# Patient Record
Sex: MALE | Race: WHITE | NOT HISPANIC OR LATINO | Employment: UNEMPLOYED | URBAN - METROPOLITAN AREA
[De-identification: names, ages, dates, MRNs, and addresses within clinical notes are randomized per-mention and may not be internally consistent; named-entity substitution may affect disease eponyms.]

---
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
## 2018-01-12 NOTE — RESULT NOTES
Verified Results  ECHO COMPLETE WITH CONTRAST IF INDICATED [id number] 12:56PM Sneha Farrar     Test Name Result Flag Reference   ECHO COMPLETE WITH CONTRAST IF INDICATED (Report)     [address]   [address]   [phone]     Transthoracic Echocardiogram   2D, M-mode, Doppler, and Color Doppler     Study date: 10-Oct-2017     Patient: Manuel Small   MR number: [id number]   Account number: [de-identified]   : 2000   Age: 21 years   Gender: Male   Status: Outpatient   [facility]: [address] [place] [address] [facility]   Height: 75 in   Weight: 201 lb   BP: 120/ 82 mmHg     Indications: Chest pain, bradycardia     Diagnoses: R00 1 - Bradycardia, unspecified, R07 9 - Chest pain, unspecified     Sonographer: RUFINO Houser   Referring Physician: Abi Fuentes DO   Group: [id number]   Interpreting Physician: Seamus Timmons DO     SUMMARY     LEFT VENTRICLE:   Systolic function was normal by visual assessment  Ejection fraction was estimated to be 60 %  MITRAL VALVE:   There was trace regurgitation  PULMONIC VALVE:   There was mild regurgitation  HISTORY: PRIOR HISTORY: Asthma     PROCEDURE: [facility]  This was a routine study  The transthoracic approach was used  The study included complete 2D imaging, M-mode, complete spectral Doppler, and color Doppler  Image   quality was adequate  LEFT VENTRICLE: Size was normal  Systolic function was normal by visual assessment  Ejection fraction was estimated to be 60 %  DOPPLER: Left ventricular diastolic function parameters were normal      RIGHT VENTRICLE: The size was normal  Systolic function was normal      LEFT ATRIUM: Size was normal      RIGHT ATRIUM: Size was normal      MITRAL VALVE: Valve structure was normal  DOPPLER: There was trace regurgitation  AORTIC VALVE: The valve was trileaflet   Leaflets exhibited good mobility  TRICUSPID VALVE: The valve structure was normal  DOPPLER: There was no significant regurgitation  PULMONIC VALVE: Not well visualized  DOPPLER: There was mild regurgitation  PERICARDIUM: There was no pericardial effusion  The pericardium was normal in appearance  AORTA: The root exhibited normal size       MEASUREMENT TABLES     DOPPLER MEASUREMENTS   Tricuspid valve  (Reference normals)   RV-RA peak gradient  20 mmHg  (--)     SYSTEM MEASUREMENT TABLES     2D   %FS: 35 17 %   AV Diam: 2 65 cm   EDV(Teich): 131 48 ml   EF(Cube): 72 75 %   EF(Teich): 64 09 %   ESV(Cube): 39 07 ml   ESV(Teich): 47 21 ml   IVSd: 1 06 cm   LA Area: 18 86 cm2   LA Diam: 3 48 cm   LVEDV MOD A4C: 231 29 ml   LVEF MOD A4C: 50 2 %   LVESV MOD A4C: 115 19 ml   LVIDd: 5 23 cm   LVIDs: 3 39 cm   LVLd A4C: 11 18 cm   LVLs A4C: 9 56 cm   LVPWd: 1 12 cm   RA Area: 18 05 cm2   RV Diam : 4 02 cm   SV MOD A4C: 116 11 ml   SV(Cube): 104 32 ml   SV(Teich): 84 27 ml     CW   TR Vmax: 2 25 m/s   TR maxP 26 mmHg     MM   TAPSE: 2 3 cm     PW   E': 0 13 m/s   E/E': 5 83   MV A Varun: 0 3 m/s   MV Dec Allen: 2 49 m/s2   MV DecT: 303 52 ms   MV E Varun: 0 76 m/s   MV E/A Ratio: 2 52     [facility] Accredited [facility]     Prepared and electronically signed by     Kody Good DO   Signed 10-Oct-2017 14:51:15

## 2018-01-12 NOTE — PROGRESS NOTES
Assessment    1  Encounter for preventive health examination (V70 0) (Z00 00)    Plan  Health Maintenance    · Always use a seat belt and shoulder strap when riding or driving a motor vehicle ;  Status:Complete;   Done: 74FWN7010 02:12PM   · Begin or continue regular aerobic exercise  Gradually work up to at least 3 sessions of 30  minutes of exercise a week ; Status:Complete;   Done: 95CXO7096 02:12PM   · Brush your teeth 3 times a day and floss at least once a day ; Status:Complete;   Done:  75IVQ3684 02:12PM   · Decreasing the stress in your life may help your condition improve ; Status:Complete;    Done: 09ILX0944 02:12PM   · Diets that are low in carbohydrates and high in protein are very popular for weight loss ;  Status:Complete;   Done: 43ZSU2302 02:12PM   · Eat a low fat and low cholesterol diet ; Status:Complete;   Done: 73GAI4147 02:12PM   · Put a smoke detector in your living area to warn you in case of fire ; Status:Complete;    Done: 81EUZ1320 02:12PM   · Regular aerobic exercise can help reduce stress ; Status:Complete;   Done: 45WYJ0331  02:12PM   · Stretch and warm up your muscles during the first 10 minutes , then cool down your  muscles for the last 10 minutes of exercise ; Status:Complete;   Done: 41KIW9347  02:12PM   · There are many ways to reduce your risk of catching or spreading a sexually transmitted  Infection ; Status:Complete;   Done: 17EDE3220 02:12PM   · Use a sun block product with an SPF of 15 or more ; Status:Complete;   Done:  87FQY2133 02:12PM   · Using a latex condom can help prevent pregnancy  It can also help to prevent the spread  of sexually transmitted infections ; Status:Complete;   Done: 82WPH7373 02:12PM   · We recommend routine visits to a dentist ; Status:Complete;   Done: 47TTS8367  02:12PM   · We recommend that you bring your body mass index down to 26 ; Status:Complete;    Done: 80KRK0838 02:12PM   · Call [phone] if:  You have any warning signs of skin cancer ; Status:Complete;    Done: 70PDZ2341 02:12PM    Discussion/Summary  Impression: health maintenance visit  Currently, he eats a healthy diet and has an adequate exercise regimen  Testicular cancer screening: the risks and benefits of testicular cancer screening were discussed, self testicular exam technique was taught and monthly self testicular exam was advised  Colorectal cancer screening: colorectal cancer screening is not indicated  The immunizations are up to date  He was advised to be evaluated by a dentist  Advice and education were given regarding nutrition, aerobic exercise, weight bearing exercise, weight loss, calcium supplements, vitamin D supplements, reproductive health, sunscreen use, self skin examination, helmet use and seat belt use  Patient discussion: discussed with the patient, father  Carlos and his dad are pleased with his weight loss He does mixed martial arts and boxing He is eating a vegan diet also Pateint is cleared to drive  Chief Complaint  HERE TODAY FOR DRIVERS PHYSICAL      History of Present Illness  HM, Adult Male: The patient is being seen for a health maintenance evaluation  The last health maintenance visit was 18 month(s) ago  Social History: Household members include mother  He is unmarried  Work status: working part-time  The patient has never smoked cigarettes  He reports never drinking alcohol  He has never used illicit drugs  General Health: The patient's health since the last visit is described as good  He has regular dental visits  He denies vision problems  He denies hearing loss  Immunizations status: up to date  Lifestyle:  He consumes a diverse and healthy diet  He has weight concerns  He exercises regularly  He does not use tobacco  He denies alcohol use  He denies drug use  Reproductive health:  the patient is sexually active  birth control is not being practiced  He denies erectile dysfunction     Screening:   HPI: Patient is here with his dad for 's PE Patient is looking for work  He is also trying to get into trade school He has no new issues He went to a vegan diet and started boxing and basketball and lost over 100 pounds Patient and his dad are very pleased with his results      Review of Systems    Constitutional: No fever or chills, feels well, no tiredness, no recent weight gain or weight loss  Eyes: no eye pain and no eyesight problems  ENT: no complaints of earache, no hearing loss, no nosebleeds, no nasal discharge, no sore throat, no hoarseness  Cardiovascular: no chest pain, no intermittent leg claudication, no palpitations and no extremity edema  Respiratory: No complaints of shortness of breath, no wheezing, no cough, no SOB on exertion, no orthopnea or PND  Gastrointestinal: No complaints of abdominal pain, no constipation, no nausea or vomiting, no diarrhea or bloody stools  Genitourinary: no dysuria and no incontinence  Musculoskeletal: no arthralgias and no myalgias  Integumentary: no rashes  Neurological: No compliants of headache, no confusion, no convulsions, no numbness or tingling, no dizziness or fainting, no limb weakness, no difficulty walking  Psychiatric: no anxiety, no sleep disturbances and no depression  Active Problems    1  Allergic rhinitis (477 9) (J30 9)   2  Asthma (493 90) (J45 909)   3  Psychosocial Support Lack Of Social Support From Friends (V62 4)   4  Somatic dysfunction of thoracic region (739 2) (M99 02)   5   Unrefreshed by sleep (780 59) (G47 8)    Past Medical History    · History of acute pharyngitis (V12 69) (Z87 09)   · History of Strain of thoracic region (847 1) (S29 019A)    Family History  Mother    · No pertinent family history  Maternal Grandmother    · Family history of Hypertension (V17 49)  Family History    · Family history of Asthma (V17 5)    Social History    · Psychosocial Support Lack Of Social Support From Friends (V62 4)   · Sexual Activity Denied   · Unknown If Ever Smoked    Allergies    1  No Known Drug Allergies    Vitals   Recorded: 04Eqm9306 01:50PM   Temperature 29 8 F   Systolic 228   Diastolic 80   Height 6 ft 3 in   Weight 195 lb    BMI Calculated 24 37   BSA Calculated 2 17   BMI Percentile 67 %   2-20 Stature Percentile 97 %   2-20 Weight Percentile 90 %     Physical Exam    Constitutional   General appearance: No acute distress, well appearing and well nourished  Head and Face   Head and face: Normal     Palpation of the face and sinuses: No sinus tenderness  Eyes   Conjunctiva and lids: No erythema, swelling or discharge  Pupils and irises: Equal, round, reactive to light  Ears, Nose, Mouth, and Throat   External inspection of ears and nose: Normal     Otoscopic examination: Tympanic membranes translucent with normal light reflex  Canals patent without erythema  Hearing: Normal     Nasal mucosa, septum, and turbinates: Normal without edema or erythema  Lips, teeth, and gums: Normal, good dentition  Oropharynx: Normal with no erythema, edema, exudate or lesions  Neck   Neck: Supple, symmetric, trachea midline, no masses  Thyroid: Normal, no thyromegaly  Pulmonary   Respiratory effort: No increased work of breathing or signs of respiratory distress  Auscultation of lungs: Clear to auscultation  Cardiovascular   Auscultation of heart: Normal rate and rhythm, normal S1 and S2, no murmurs  Carotid pulses: 2+ bilaterally  Peripheral vascular exam: Normal     Examination of extremities for edema and/or varicosities: Normal     Abdomen   Abdomen: Non-tender, no masses  Liver and spleen: No hepatomegaly or splenomegaly  Lymphatic   Palpation of lymph nodes in neck: No lymphadenopathy  Musculoskeletal   Gait and station: Normal     Inspection/palpation of digits and nails: Normal without clubbing or cyanosis      Inspection/palpation of joints, bones, and muscles: Normal     Range of motion: Normal  Stability: Normal     Muscle strength/tone: Normal     Skin   Skin and subcutaneous tissue: Normal without rashes or lesions  Neurologic   Cranial nerves: Cranial nerves 2-12 intact  Cortical function: Normal mental status  Reflexes: 2+ and symmetric  Sensation: No sensory loss  Coordination: Normal finger to nose and heel to shin         Signatures   Electronically signed by : Ventura Sy DO; Aug  4 2017  2:13PM EST                       (Author)

## 2018-01-13 VITALS
BODY MASS INDEX: 24.87 KG/M2 | OXYGEN SATURATION: 99 % | SYSTOLIC BLOOD PRESSURE: 102 MMHG | WEIGHT: 200 LBS | HEART RATE: 50 BPM | DIASTOLIC BLOOD PRESSURE: 48 MMHG | HEIGHT: 75 IN

## 2018-01-13 VITALS
TEMPERATURE: 98.3 F | DIASTOLIC BLOOD PRESSURE: 80 MMHG | SYSTOLIC BLOOD PRESSURE: 122 MMHG | HEIGHT: 75 IN | BODY MASS INDEX: 24.25 KG/M2 | WEIGHT: 195 LBS

## 2018-01-13 NOTE — RESULT NOTES
Verified Results  HOLTER MONITOR - 24 HOUR [id number] 12:57PM Romel Cox Order Number: [id number]    - Patient Instructions: To schedule this appointment, please contact Central Scheduling at [phone]  Test Name Result Flag Reference   HOLTER MONITOR - 24 HOUR (Report)     Indication: Bradycardia     Patient was monitored for a total of 24 hours from 1:35 p m  on 10/10/2017  A total of 42532 beats were monitored  The image quality was good  The predominant rhythm was normal sinus rhythm  Average heart rate was 60 bpm  Minimum heart rate was 36 bpm which was sinus bradycardia at 5:47 a m    Maximum heart rate was 133 bpm which was sinus tachycardia at 3:14 p m  Ida Nicole There was 45 minutes of sinus tachycardia  There was 16 hours of sinus bradycardia  There was no ventricular ectopy during the monitored period  Supraventricular ectopy consisted of 49 PACs only  There was no atrial fibrillation during the monitored period  There were no other arrhythmias other than as described above  A symptom diary was returned for correlation  Patient complained of pain on 1 occasion which correlated with sinus tachycardia     Impression:     Overall unremarkable 24 hrs of holter monitoring  Predominant rhythm was normal sinus rhythm  Normal diurnal variation of heart rate   Minimal supraventricular ectopy during the monitored period  No ventricular ectopy during the monitored period  One episode of pain was noted which correlated with sinus tachycardia  There were no ST segment or T-wave changes

## 2018-01-15 VITALS
SYSTOLIC BLOOD PRESSURE: 120 MMHG | WEIGHT: 201.13 LBS | HEIGHT: 75 IN | BODY MASS INDEX: 25.01 KG/M2 | DIASTOLIC BLOOD PRESSURE: 82 MMHG

## 2018-01-16 NOTE — PROGRESS NOTES
Assessment    1  Encounter for preventive health examination (V70 0) (Z00 00)   2  Obesity (278 00) (E66 9)    Plan  Obesity    · (1) CBC/PLT/DIFF; Status:Active; Requested for:36Rnp7126;    · (1) COMPREHENSIVE METABOLIC PANEL; Status:Active; Requested for:60Jkw8256;    · (1) HEMOGLOBIN A1C; Status:Active; Requested for:10Bdq9421;    · (1) LIPID PANEL, FASTING; Status:Active; Requested for:30Neg3233;    · (1) TSH; Status:Active; Requested for:21Cpj4020;    · Begin a limited exercise program ; Status:Complete;   Done: 36VPK4111 10:23AM   · Diets that are low in carbohydrates and high in protein are very popular for weight loss ;  Status:Complete;   Done: 63EDK5297 10:23AM   · Eat a low fat and low cholesterol diet ; Status:Complete;   Done: 05SJP8166 10:23AM   · Some eating tips that can help you lose weight ; Status:Complete;   Done: 69GOE3636  10:23AM   · We recommend that you bring your body mass index down to 26 ; Status:Complete;    Done: 26TZW0158 10:23AM    Discussion/Summary    Impression:   No elimination, skin and sleep concerns  Growth concerns include excessive weight gain  constant hunger  No vaccines needed  Chief Complaint  DRIVERS PERMIT PHYSICAL      History of Present Illness  HM, 12-18 years Male (Brief): Toan Bennett presents today for routine health maintenance with his 23year-old male presented to the office for driving physical    General Health: The child's health since the last visit is described as fair  Dental hygiene: Good  Immunization status: Up to date  Caregiver concerns:  Having a lot of problems making good choices when he eats  Caregivers deny concerns regarding behavior and school  Nutrition/Elimination:   Diet:  his current diet needs improvement: is too high in calories, is insufficient in fruit, is insufficient in vegetables, needs elimination of junk food, is high in fat, is high in salt and is high in sugar  No elimination issues are expressed  Sleep:   No sleep issues are reported  Behavior:   Health Risks:   Childcare/School:   Sports Participation Questions:      Review of Systems    Constitutional: recent 15 lb weight gain, but No complaints of tiredness, feels well, no fever, no chills, no recent weight gain or loss  Eyes: No complaints of eye pain, no discharge from eyes, no eyesight problems, eyes do not itch, no red or dry eyes  ENT: no complaints of nasal discharge, no earache, no loss of hearing, no hoarseness or sore throat, no nosebleeds  Cardiovascular: No complaints of chest pain, no palpitations, normal heart rate, no leg claudication or lower leg edema  Respiratory: No complaints of shortness of breath, no wheezing or cough, no dyspnea on exertion  Gastrointestinal: No complaints of abdominal pain, no nausea or vomiting, no constipation, no diarrhea or bloody stools  Genitourinary: No complaints of testicular pain, no dysuria or nocturia, no incontinence, no hesitancy, no gential lesion  Musculoskeletal: No complaints of joint stiffness or swelling, no myalgias, no limb pain or swelling  Integumentary: No complaints of skin rash, no skin lesions or wounds, no itching, no dry skin  Neurological: No complaints of headache, no numbness or tingling, no dizziness or fainting, no confusion, no convulsions, no limb weakness or difficulty walking  Psychiatric: No complaints of feeling depressed, no suicidal thoughts, no emotional problems, no anxiety, no sleep disturbances or changes in personality  Endocrine: No complaints of muscle weakness, no feelings of weakness, no erectile dysfunction, no deepening of voice, no hot flashes or proptosis  Hematologic/Lymphatic: No complaints of swollen glands, no neck swollen glands, does not bleed or bruise easily  ROS reported by the patient  Active Problems    1  Allergic rhinitis (477 9) (J30 9)   2  Asthma (493 90) (J45 909)   3  Obesity (278 00) (E66 9)   4   Psychosocial Support Lack Of Social Support From Friends (V62 4)   5  Somatic dysfunction of thoracic region (739 2) (M99 02)   6  Unrefreshed by sleep (780 59) (G47 8)    Past Medical History    · History of acute pharyngitis (V12 69) (Z87 09)   · History of Strain of thoracic region (847 1) (S29 019A)    Family History    · Family history of Hypertension (V17 49)    · Family history of Asthma (V17 5)    Social History    · Psychosocial Support Lack Of Social Support From Friends (V62 4)   · Sexual Activity Denied   · Unknown If Ever Smoked    Current Meds   1  Fluticasone Propionate 50 MCG/ACT Nasal Suspension; USE 2 SPRAYS IN EACH   NOSTRIL ONCE DAILY; Therapy: 40OQD7599 to (Last WO:93LCO1532)  Requested for: 47PEM8140 Ordered   2  Loratadine 10 MG Oral Tablet; TAKE 1 TABLET EVERY MORNING; Therapy: 22UFA8780 to (Evaluate:13Axa5794)  Requested for: 31UWC2327; Last   Rx:10Oqf0777 Ordered   3  Montelukast Sodium 10 MG Oral Tablet; Take 1 tablet by mouth at bedtime  Requested   for: 09NWH0118; Last Rx:69Len0316 Ordered   4  ProAir  (90 Base) MCG/ACT Inhalation Aerosol Solution; 2 PUFFS QID AS   NEEDED FOR SOB  Requested for: 11ZIJ3283; Last Rx:89Zks9174 Ordered    Allergies    1  No Known Drug Allergies    Vitals   Recorded: 01BZX7167 32:41XG   Systolic 201   Diastolic 82   Height 6 ft 3 in   2-20 Stature Percentile 98 %   Weight 313 lb    2-20 Weight Percentile 99 %   BMI Calculated 39 12   BMI Percentile 99 %   BSA Calculated 2 65     Physical Exam    Constitutional - General appearance: Abnormal  overweight  Eyes - Conjunctiva and lids: No injection, edema or discharge  Pupils and irises: Equal, round, reactive to light bilaterally  Ophthalmoscopic examination: Optic discs sharp  Ears, Nose, Mouth, and Throat - External inspection of ears and nose: Normal without deformities or discharge  Otoscopic examination: Tympanic membranes gray, translucent with good bony landmarks and light reflex  Canals patent without erythema  Hearing: Normal  Nasal mucosa, septum, and turbinates: Normal, no edema or discharge  Lips, teeth, and gums: Normal, good dentition  Oropharynx: Moist mucosa, normal tongue and tonsils without lesions  Neck - Neck: Supple, symmetric, no masses  Thyroid: No thyromegaly  Pulmonary - Respiratory effort: Normal respiratory rate and rhythm, no increased work of breathing  Percussion of chest: Normal  Palpation of chest: Normal  Auscultation of lungs: Clear bilaterally  Cardiovascular - Palpation of heart: Normal PMI, no thrill  Auscultation of heart: Regular rate and rhythm, normal S1 and S2, no murmur  Carotid pulses: Normal, 2+ bilaterally  Abdominal aorta: Normal  Femoral pulses: Normal, 2+ bilaterally  Pedal pulses: Normal, 2+ bilaterally  Examination of extremities for edema and/or varicosities: Normal    Chest - Breasts: Normal  Palpation of breasts and axillae: Normal    Abdomen - Abdomen: Normal bowel sounds, soft, non-tender, no masses  Liver and spleen: No hepatomegaly or splenomegaly  Examination for hernias: No hernias palpated  Lymphatic - Palpation of lymph nodes in neck: No anterior or posterior cervical lymphadenopathy  Palpation of lymph nodes in axillae: No lymphadenopathy  Palpation of lymph nodes in groin: No lymphadenopathy  Palpation of lymph nodes in other areas: No lymphadenopathy  Musculoskeletal - Gait and station: Normal gait  Digits and nails: Normal without clubbing or cyanosis  Inspection/palpation of joints, bones, and muscles: Normal  Evaluation for scoliosis: No scoliosis on exam  Range of motion: Normal  Stability: No joint instability  Muscle strength/tone: Normal    Skin - Skin and subcutaneous tissue: No rash or lesions   Palpation of skin and subcutaneous tissue: Normal    Neurologic - Cranial nerves: Normal  Reflexes: Normal  Sensation: Normal    Psychiatric - judgment and insight: Normal  Orientation to person, place, and time: Normal  Recent and remote memory: Normal  Mood and affect: Normal       Procedure    Procedure: Visual Acuity Test    Indication: routine screening  Inforrmation supplied by a Snellen chart     Results: 20/20 in both eyes without corrective device, 20/20 in the right eye without corrective device, 20/20 in the left eye without corrective device      Signatures   Electronically signed by : Daria Pickens DO; Feb 29 2016 10:24AM EST                       (Author)

## 2022-06-15 ENCOUNTER — APPOINTMENT (EMERGENCY)
Dept: RADIOLOGY | Facility: HOSPITAL | Age: 22
End: 2022-06-15
Payer: COMMERCIAL

## 2022-06-15 ENCOUNTER — HOSPITAL ENCOUNTER (EMERGENCY)
Facility: HOSPITAL | Age: 22
Discharge: HOME | End: 2022-06-15
Attending: EMERGENCY MEDICINE | Admitting: EMERGENCY MEDICINE
Payer: COMMERCIAL

## 2022-06-15 VITALS
OXYGEN SATURATION: 100 % | RESPIRATION RATE: 19 BRPM | HEIGHT: 76 IN | HEART RATE: 86 BPM | WEIGHT: 213.2 LBS | SYSTOLIC BLOOD PRESSURE: 126 MMHG | TEMPERATURE: 97.6 F | DIASTOLIC BLOOD PRESSURE: 68 MMHG | BODY MASS INDEX: 25.96 KG/M2

## 2022-06-15 DIAGNOSIS — S69.91XA INJURY OF RIGHT HAND, INITIAL ENCOUNTER: ICD-10-CM

## 2022-06-15 DIAGNOSIS — S01.511A LIP LACERATION, INITIAL ENCOUNTER: ICD-10-CM

## 2022-06-15 DIAGNOSIS — S62.339A CLOSED BOXER'S FRACTURE, INITIAL ENCOUNTER: Primary | ICD-10-CM

## 2022-06-15 DIAGNOSIS — R04.0 EPISTAXIS DUE TO TRAUMA: ICD-10-CM

## 2022-06-15 PROCEDURE — 12011 RPR F/E/E/N/L/M 2.5 CM/<: CPT | Performed by: EMERGENCY MEDICINE

## 2022-06-15 PROCEDURE — NC001 PR NO CHARGE: Performed by: ORTHOPAEDIC SURGERY

## 2022-06-15 PROCEDURE — 73130 X-RAY EXAM OF HAND: CPT

## 2022-06-15 PROCEDURE — 71046 X-RAY EXAM CHEST 2 VIEWS: CPT

## 2022-06-15 PROCEDURE — 99284 EMERGENCY DEPT VISIT MOD MDM: CPT | Performed by: EMERGENCY MEDICINE

## 2022-06-15 PROCEDURE — 99284 EMERGENCY DEPT VISIT MOD MDM: CPT

## 2022-06-15 PROCEDURE — 73120 X-RAY EXAM OF HAND: CPT

## 2022-06-15 RX ORDER — LIDOCAINE HYDROCHLORIDE 10 MG/ML
20 INJECTION, SOLUTION EPIDURAL; INFILTRATION; INTRACAUDAL; PERINEURAL ONCE
Status: COMPLETED | OUTPATIENT
Start: 2022-06-15 | End: 2022-06-15

## 2022-06-15 RX ORDER — IBUPROFEN 600 MG/1
600 TABLET ORAL ONCE
Status: COMPLETED | OUTPATIENT
Start: 2022-06-15 | End: 2022-06-15

## 2022-06-15 RX ORDER — SENNOSIDES 8.6 MG
650 CAPSULE ORAL EVERY 8 HOURS PRN
Qty: 30 TABLET | Refills: 0 | Status: SHIPPED | OUTPATIENT
Start: 2022-06-15

## 2022-06-15 RX ORDER — NAPROXEN 500 MG/1
500 TABLET ORAL 2 TIMES DAILY WITH MEALS
Qty: 30 TABLET | Refills: 0 | Status: SHIPPED | OUTPATIENT
Start: 2022-06-15

## 2022-06-15 RX ORDER — ACETAMINOPHEN 325 MG/1
975 TABLET ORAL ONCE
Status: DISCONTINUED | OUTPATIENT
Start: 2022-06-15 | End: 2022-06-15 | Stop reason: HOSPADM

## 2022-06-15 RX ORDER — OXYCODONE HYDROCHLORIDE 5 MG/1
5 TABLET ORAL ONCE
Status: COMPLETED | OUTPATIENT
Start: 2022-06-15 | End: 2022-06-15

## 2022-06-15 RX ORDER — LIDOCAINE HYDROCHLORIDE AND EPINEPHRINE 10; 10 MG/ML; UG/ML
10 INJECTION, SOLUTION INFILTRATION; PERINEURAL ONCE
Status: COMPLETED | OUTPATIENT
Start: 2022-06-15 | End: 2022-06-15

## 2022-06-15 RX ADMIN — IBUPROFEN 600 MG: 600 TABLET ORAL at 15:24

## 2022-06-15 RX ADMIN — LIDOCAINE HYDROCHLORIDE 20 ML: 10 INJECTION, SOLUTION EPIDURAL; INFILTRATION; INTRACAUDAL; PERINEURAL at 13:54

## 2022-06-15 RX ADMIN — LIDOCAINE HYDROCHLORIDE,EPINEPHRINE BITARTRATE 10 ML: 10; .01 INJECTION, SOLUTION INFILTRATION; PERINEURAL at 13:09

## 2022-06-15 RX ADMIN — OXYCODONE HYDROCHLORIDE 5 MG: 5 TABLET ORAL at 15:24

## 2022-06-15 NOTE — DISCHARGE INSTRUCTIONS
Discharge Instructions - Orthopedics  Maicol Siu 23 y o  male MRN: [id number]  Unit/Bed#: X ray    Weight Bearing Status:                                           Nonweightbearing to right upper extremity in splint    DVT prophylaxis  None     Pain:  Continue analgesics as directed    Splint Instructions:   Please keep clean, dry and intact until follow up     Appt Instructions: If you do not have your appointment, please call the clinic at [phone] t  Otherwise followup as scheduled     Contact the office sooner if you experience any increased numbness/tingling in the extremities        Miscellaneous:  F/u one week with Dr Kelly Monsivais
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
## 2022-06-20 NOTE — TELEPHONE ENCOUNTER
Hello,    Please advise if the following patient can be forced onto the schedule:    Patient: Lexx Brar    :[birth year]    MRN: [id number]    Call back #: [phone] Sandhya Millers)    Insurance:MA pending     Reason for appointment: Status post reduction of the angulated fracture of the 5th metacarpal   There is mild persistent angulation remaining    Requested doctor/location: [place]       Thank you      Email sent to [facility]

## 2022-06-21 ENCOUNTER — APPOINTMENT (OUTPATIENT)
Dept: RADIOLOGY | Facility: MEDICAL CENTER | Age: 22
End: 2022-06-21
Payer: COMMERCIAL

## 2022-06-21 VITALS — WEIGHT: 214 LBS | HEART RATE: 88 BPM | HEIGHT: 76 IN | BODY MASS INDEX: 26.06 KG/M2

## 2022-06-21 DIAGNOSIS — S62.339A CLOSED BOXER'S FRACTURE, INITIAL ENCOUNTER: ICD-10-CM

## 2022-06-21 DIAGNOSIS — S62.326A CLOSED DISPLACED FRACTURE OF SHAFT OF FIFTH METACARPAL BONE OF RIGHT HAND, INITIAL ENCOUNTER: Primary | ICD-10-CM

## 2022-06-21 PROCEDURE — 29125 APPL SHORT ARM SPLINT STATIC: CPT | Performed by: ORTHOPAEDIC SURGERY

## 2022-06-21 PROCEDURE — 99204 OFFICE O/P NEW MOD 45 MIN: CPT | Performed by: ORTHOPAEDIC SURGERY

## 2022-06-21 PROCEDURE — 73130 X-RAY EXAM OF HAND: CPT

## 2022-06-21 RX ORDER — CHLORHEXIDINE GLUCONATE 4 G/100ML
SOLUTION TOPICAL DAILY PRN
Status: CANCELLED | OUTPATIENT
Start: 2022-06-21

## 2022-06-21 RX ORDER — CHLORHEXIDINE GLUCONATE 0.12 MG/ML
15 RINSE ORAL ONCE
Status: CANCELLED | OUTPATIENT
Start: 2022-06-21 | End: 2022-06-21

## 2022-06-21 RX ORDER — CEFAZOLIN SODIUM 2 G/50ML
2000 SOLUTION INTRAVENOUS ONCE
Status: CANCELLED | OUTPATIENT
Start: 2022-06-24 | End: 2022-06-21
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
## 2022-06-24 NOTE — TELEPHONE ENCOUNTER
Cristina Hernandez, this was the ORIF metacarpal that was supposed to go today  Ran it by Dr Harrell Cluster  He's asking if you can get him on Wednesday after his first hand case? Thanks!

## 2022-06-24 NOTE — TELEPHONE ENCOUNTER
Spoke with pt's mother, Adelina Severe, she stated pt was scheduled for sx today but they never received a phone call with patients sx time  She would like to know when pt will be rescheduled

## 2022-06-27 NOTE — TELEPHONE ENCOUNTER
Mitch Cummings,    Please see the message below  Let me know if you need any help  Thanks!   Manny Castle

## 2022-06-27 NOTE — TELEPHONE ENCOUNTER
Spoke with patients mom, notified her that pt is scheduled for sx 6/29 per Dr Arteaga Slice  Advised her that pt will receive a call the night before with sx time and instructions  Understanding verbalized

## 2022-06-28 NOTE — TELEPHONE ENCOUNTER
Patient called [place] [facility] stating he missed a call from office  I did advise no notation seen in chart of staff calling, but I did advise it might be OR calling to advise what time is Surgery  Please call patient back at [phone] TY

## 2022-06-28 NOTE — TELEPHONE ENCOUNTER
Spoke to pt and told him it may have been hospital calling with arrival time  Advised pt he should come to the hospital by noon and discussed no eating/drinking after midnight and making sure to use the wash  Pt asked if any other option than surgery  Explained that b/c the way his bone is, if we don't do surgery it will heal with malrotation and this could affect his  strength and making a fist in the long-run  Patient states he doesn't like the idea of having foreign object in him  I explained that typically this hardware does stay in  It can be removed if pt having issues with it, but we do not typically remove this electively  I did pass the message onto Dr Pedro Williamson as well

## 2022-06-29 NOTE — TELEPHONE ENCOUNTER
Pt canceled todays sx due to transportation issues, pt would like to r/s and is requesting to be scheduled before 10 am  I advised pt I will reach out to Dr Raffy Prajapati and get back to him  Understanding verbalized

## 2022-07-15 NOTE — TELEPHONE ENCOUNTER
Pt mom called and left a vm requesting to speak to someone regarding pt's cancelled sx    Please advise  Mom can be reached at [phone]
